# Patient Record
Sex: MALE | Race: WHITE | Employment: FULL TIME | URBAN - METROPOLITAN AREA
[De-identification: names, ages, dates, MRNs, and addresses within clinical notes are randomized per-mention and may not be internally consistent; named-entity substitution may affect disease eponyms.]

---

## 2021-05-19 ENCOUNTER — HOSPITAL ENCOUNTER (EMERGENCY)
Age: 36
Discharge: HOME | End: 2021-05-19
Attending: EMERGENCY MEDICINE
Payer: COMMERCIAL

## 2021-05-19 ENCOUNTER — APPOINTMENT (OUTPATIENT)
Dept: CT IMAGING | Age: 36
End: 2021-05-19
Attending: EMERGENCY MEDICINE
Payer: COMMERCIAL

## 2021-05-19 VITALS
SYSTOLIC BLOOD PRESSURE: 126 MMHG | OXYGEN SATURATION: 100 % | HEART RATE: 86 BPM | DIASTOLIC BLOOD PRESSURE: 83 MMHG | RESPIRATION RATE: 15 BRPM | TEMPERATURE: 98.8 F

## 2021-05-19 DIAGNOSIS — R10.32 ABDOMINAL PAIN, LLQ (LEFT LOWER QUADRANT): Primary | ICD-10-CM

## 2021-05-19 LAB
ALBUMIN SERPL-MCNC: 4.7 G/DL (ref 3.5–5)
ALBUMIN/GLOB SERPL: 1.2 {RATIO} (ref 1.1–2.2)
ALP SERPL-CCNC: 70 U/L (ref 45–117)
ALT SERPL-CCNC: 51 U/L (ref 12–78)
ANION GAP SERPL CALC-SCNC: 8 MMOL/L (ref 5–15)
AST SERPL-CCNC: 20 U/L (ref 15–37)
BASOPHILS # BLD: 0.1 K/UL (ref 0–0.1)
BASOPHILS NFR BLD: 1 % (ref 0–1)
BILIRUB SERPL-MCNC: 0.4 MG/DL (ref 0.2–1)
BUN SERPL-MCNC: 11 MG/DL (ref 6–20)
BUN/CREAT SERPL: 11 (ref 12–20)
CALCIUM SERPL-MCNC: 10.1 MG/DL (ref 8.5–10.1)
CHLORIDE SERPL-SCNC: 108 MMOL/L (ref 97–108)
CO2 SERPL-SCNC: 22 MMOL/L (ref 21–32)
CREAT SERPL-MCNC: 0.98 MG/DL (ref 0.7–1.3)
DIFFERENTIAL METHOD BLD: ABNORMAL
EOSINOPHIL # BLD: 0.1 K/UL (ref 0–0.4)
EOSINOPHIL NFR BLD: 0 % (ref 0–7)
ERYTHROCYTE [DISTWIDTH] IN BLOOD BY AUTOMATED COUNT: 12 % (ref 11.5–14.5)
GLOBULIN SER CALC-MCNC: 3.9 G/DL (ref 2–4)
GLUCOSE SERPL-MCNC: 87 MG/DL (ref 65–100)
HCT VFR BLD AUTO: 45.4 % (ref 36.6–50.3)
HGB BLD-MCNC: 15.6 G/DL (ref 12.1–17)
IMM GRANULOCYTES # BLD AUTO: 0 K/UL (ref 0–0.04)
IMM GRANULOCYTES NFR BLD AUTO: 0 % (ref 0–0.5)
LIPASE SERPL-CCNC: 85 U/L (ref 73–393)
LYMPHOCYTES # BLD: 2.4 K/UL (ref 0.8–3.5)
LYMPHOCYTES NFR BLD: 20 % (ref 12–49)
MCH RBC QN AUTO: 29.4 PG (ref 26–34)
MCHC RBC AUTO-ENTMCNC: 34.4 G/DL (ref 30–36.5)
MCV RBC AUTO: 85.5 FL (ref 80–99)
MONOCYTES # BLD: 0.6 K/UL (ref 0–1)
MONOCYTES NFR BLD: 5 % (ref 5–13)
NEUTS SEG # BLD: 8.8 K/UL (ref 1.8–8)
NEUTS SEG NFR BLD: 74 % (ref 32–75)
NRBC # BLD: 0 K/UL (ref 0–0.01)
NRBC BLD-RTO: 0 PER 100 WBC
PLATELET # BLD AUTO: 400 K/UL (ref 150–400)
PMV BLD AUTO: 11.3 FL (ref 8.9–12.9)
POTASSIUM SERPL-SCNC: 3.6 MMOL/L (ref 3.5–5.1)
PROT SERPL-MCNC: 8.6 G/DL (ref 6.4–8.2)
RBC # BLD AUTO: 5.31 M/UL (ref 4.1–5.7)
SODIUM SERPL-SCNC: 138 MMOL/L (ref 136–145)
WBC # BLD AUTO: 12 K/UL (ref 4.1–11.1)

## 2021-05-19 PROCEDURE — 74011250636 HC RX REV CODE- 250/636: Performed by: EMERGENCY MEDICINE

## 2021-05-19 PROCEDURE — 85025 COMPLETE CBC W/AUTO DIFF WBC: CPT

## 2021-05-19 PROCEDURE — 80053 COMPREHEN METABOLIC PANEL: CPT

## 2021-05-19 PROCEDURE — 74177 CT ABD & PELVIS W/CONTRAST: CPT

## 2021-05-19 PROCEDURE — 83690 ASSAY OF LIPASE: CPT

## 2021-05-19 PROCEDURE — 74011000636 HC RX REV CODE- 636: Performed by: RADIOLOGY

## 2021-05-19 PROCEDURE — 99283 EMERGENCY DEPT VISIT LOW MDM: CPT

## 2021-05-19 PROCEDURE — 36415 COLL VENOUS BLD VENIPUNCTURE: CPT

## 2021-05-19 RX ADMIN — IOPAMIDOL 100 ML: 755 INJECTION, SOLUTION INTRAVENOUS at 20:29

## 2021-05-19 RX ADMIN — SODIUM CHLORIDE 1000 ML: 9 INJECTION, SOLUTION INTRAVENOUS at 18:18

## 2021-05-19 NOTE — ED PROVIDER NOTES
Mr. Juliann Alarcon is a 32yo male who presents to the ER with complaints of left-sided abdominal fullness. He states that his symptoms have been present for the last month. He has had several visits to his doctor in urgent care during this time. He was sent here to the ER today because he is still having this fullness. Labs in the urgent care showed an elevated white blood cell count. They are concerned about diverticulitis. Patient said he had some changes in his bowel movements recently where his bowels were not quite as big as previous. He has been started on MiraLAX and had a normal bowel movement today. He said that his sense of fullness and mild pain has never really fully gone away over the last month. Does seem to wax and wane some though. No fevers or chills. No nausea or vomiting. No changes with his urine or bowel movements. No penile discharge or pain. History reviewed. No pertinent past medical history. History reviewed. No pertinent surgical history. History reviewed. No pertinent family history. Social History     Socioeconomic History    Marital status:      Spouse name: Not on file    Number of children: Not on file    Years of education: Not on file    Highest education level: Not on file   Occupational History    Not on file   Tobacco Use    Smoking status: Not on file   Substance and Sexual Activity    Alcohol use: Not on file    Drug use: Not on file    Sexual activity: Not on file   Other Topics Concern    Not on file   Social History Narrative    Not on file     Social Determinants of Health     Financial Resource Strain:     Difficulty of Paying Living Expenses:    Food Insecurity:     Worried About Running Out of Food in the Last Year:     [address] in the Last Year:    Transportation Needs:     Lack of Transportation (Medical):      Lack of Transportation (Non-Medical):    Physical Activity:     Days of Exercise per Week:     Minutes of Exercise per Session:    Stress:     Feeling of Stress :    Social Connections:     Frequency of Communication with Friends and Family:     Frequency of Social Gatherings with Friends and Family:     Attends Bahai Services:     Active Member of Clubs or Organizations:     Attends Club or Organization Meetings:     Marital Status:    Intimate Partner Violence:     Fear of Current or Ex-Partner:     Emotionally Abused:     Physically Abused:     Sexually Abused: ALLERGIES: Erythromycin base    Review of Systems   Constitutional: Negative for chills and fever. HENT: Negative for rhinorrhea and sore throat. Respiratory: Negative for cough and shortness of breath. Cardiovascular: Negative for chest pain. Gastrointestinal: Positive for abdominal pain. Negative for diarrhea, nausea and vomiting. Genitourinary: Negative for dysuria and hematuria. Musculoskeletal: Negative for arthralgias and myalgias. Skin: Negative for pallor and rash. Neurological: Negative for dizziness, weakness and light-headedness. All other systems reviewed and are negative. Vitals:    05/19/21 1608   BP: (!) 147/80   Pulse: (!) 101   Resp: 20   Temp: 98.8 °F (37.1 °C)   SpO2: 100%            Physical Exam     Vital signs reviewed. Nursing notes reviewed.     Const:  No acute distress, well developed, well nourished  Head:  Atraumatic, normocephalic  Eyes:  PERRL, conjunctiva normal, no scleral icterus  Neck:  Supple, trachea midline  Cardiovascular: Tachycardic  Resp:  No resp distress, no increased work of breathing  Abd:  Soft, non-tender, non-distended, no rebound, no guarding  MSK:  No pedal edema, normal ROM  Neuro:  Alert and oriented x3, no cranial nerve defect  Skin:  Warm, dry, intact  Psych: Mildly anxious appearing      MDM  Number of Diagnoses or Management Options     Amount and/or Complexity of Data Reviewed  Clinical lab tests: ordered and reviewed  Tests in the radiology section of CPT®: ordered and reviewed  Review and summarize past medical records: yes    Patient Progress  Patient progress: stable          Ms. Jairon Lewis is a 30yo male who presents to the ER with complaints of left sided abd pain. No tenderness on exam.  No urinary sx. Pt. Does have an elevated WBC. No diverticulitis or other acute process on CT. Pt. To f/u with his PCP and GI or return to the ER with new or worsening sx.       Procedures

## 2021-05-19 NOTE — ED TRIAGE NOTES
Pt sent to ED from [facility] for possible diverticulitis. Pt c/o LEFT sided abd discomfort. WBC at [facility] 15.

## 2021-05-20 ENCOUNTER — PATIENT OUTREACH (OUTPATIENT)
Dept: OTHER | Age: 36
End: 2021-05-20

## 2021-05-20 NOTE — PROGRESS NOTES
[facility] progress note    Patient eligible for [facility] care management  Discussed the care management program.  Patient agrees to care management services at this time. PMH: No past medical history on file. Social History:   Social History     Socioeconomic History    Marital status:      Spouse name: Not on file    Number of children: Not on file    Years of education: Not on file    Highest education level: Not on file   Occupational History    Not on file   Tobacco Use    Smoking status: Not on file   Substance and Sexual Activity    Alcohol use: Not on file    Drug use: Not on file    Sexual activity: Not on file   Other Topics Concern    Not on file   Social History Narrative    Not on file     Social Determinants of Health     Financial Resource Strain:     Difficulty of Paying Living Expenses:    Food Insecurity:     Worried About Running Out of Food in the Last Year:     [address] in the Last Year:    Transportation Needs:     Lack of Transportation (Medical):  Lack of Transportation (Non-Medical):    Physical Activity:     Days of Exercise per Week:     Minutes of Exercise per Session:    Stress:     Feeling of Stress :    Social Connections:     Frequency of Communication with Friends and Family:     Frequency of Social Gatherings with Friends and Family:     Attends Mu-ism Services:     Active Member of Clubs or Organizations:     Attends Club or Organization Meetings:     Marital Status:    Intimate Partner Violence:     Fear of Current or Ex-Partner:     Emotionally Abused:     Physically Abused:     Sexually Abused:      Patient is 30 yo male seen in at [facility] [place] on 5/19/21 for complaints of LLQ abdominal pain and fullness. Patient reports 1 month history of same pain. Seen in Urgent Care on 5/20/21 and sent to ER due to WBC of 15,000. In ER WBC repeated was 12,000, other labs normal and  CT was negative.  Patient was discharged with instructions for follow up with PCP and GI.     ECM spoke with patient who reports he is feeling ok, no real change in pain/fullness, and states has more \"rumbling\" and gas in abdomen, but denies any other symptoms or concerns. Patient is scheduled to see PCP tomorrow 5/21/21 and plans to discuss referral to GI. Reviewed Red Flag Symptoms and discharge instructions, patient verbalized understanding. Will plan follow up in about 7 to 10 days. Patient's primary care provider relationship reviewed with patient and modified, as applicable. Care management assessment completed:    Condition Focused Assessment: Gastrointestinal Condition Focused Assessment    Skin- any open wounds, incisions or appliance no  New or worsening pain? yes  If yes, pain rated 0-10: 4-5 Location/pain characteristics: LLQ/fullness   New or worsening numbness or tingling? no  Activity level- moving several times a day, or as recommended? yes  Abnormal activity level reported: no   Nutrition- prescribed diet? yes   Diet prescribed or recommended: regular  Difficulty swallowing no  Last BM on 5/19/21 abnormal consistency or amount no  Abnormal labs: yes, WBC 12,000   In the last 24 hour have you experienced; Fever no  Low body temperature no  Chills or shaking no  Sweating no  Fast heart rate no  Fast breathing no  Dizziness/lightheadedness no  Confusion or unusual change in mental status no  Diarrhea no  Nausea no   Vomiting no  Shortness of breath or difficulty breathing no  Less urine output no  Cold, clammy, and pale skin no  Skin rash or skin color changes no      Medications:  New Medications at Discharge: none  Changed Medications at Discharge: none  Discontinued Medications at Discharge: none  No current outpatient medications on file. No current facility-administered medications for this visit. There are no discontinued medications.     Performed medication reconciliation with patient, and patient verbalizes understanding of administration of home medications. There were no barriers to obtaining medications identified at this time. Preventive Care     Health Maintenance   Topic Date Due    Hepatitis C Screening  Never done    COVID-19 Vaccine (1) Never done    DTaP/Tdap/Td series (1 - Tdap) Never done    Flu Vaccine (Season Ended) 09/01/2021    Pneumococcal 0-64 years  Aged Out        Initial Plan of Care:  Goal:Demonstrates no signs of complications or red flags in 30 days;   Review and discuss importance of monitoring and reporting red flags;   Review medications and when taken with patient to ensure understanding;  · Educate patient when to call the physician or 911;  · Assess for any barriers with care access or mobility needs at home    Goal:Completes all follow-up appointments within 30 days of ED visit;  · 5/20/21 Scheduled for PCP follow up on 5/21/21   Educate and encourage importance of FU for prevention of complications or disease progression;   Assess the patients relationship with a PCP and next FU visit scheduled;   Discuss importance of adherence to treatment plan and follow up visits;   Identify any barriers in transportation or access to FU appointments.  Assist patient with making FU appointments as needed;   o Develop list of questions, concerns before visit.  o Importance of knowing your numbers, test results. o Keep a list of the medicines you take. Assess for any barriers with care access or mobility needs at homeBarriers/Support system:  patient and spouse      Barriers/Challenges to Care: []  Decline in memory    []  Language barrier     []  Emotional                  []  Limited mobility  []  Lack of motivation     [] Vision, hearing or cognitive impairment []  Knowledge [] Financial Barriers []  Lack of support  []  Pain [x]  None    PCP/Specialist follow up: PCP 5/21/21   Reviewed red flags with patient, and patient verbalizes understanding.   Patient given an opportunity to ask questions. No other clinical/social/functional needs noted. The patient agrees to contact the PCP office for questions related to their healthcare. The patient expressed thanks, offered no additional questions and ended the call.       Plan for next call:7 to 10 days

## 2021-05-23 ENCOUNTER — HOSPITAL ENCOUNTER (EMERGENCY)
Age: 36
Discharge: HOME | End: 2021-05-23
Attending: EMERGENCY MEDICINE
Payer: COMMERCIAL

## 2021-05-23 VITALS
SYSTOLIC BLOOD PRESSURE: 118 MMHG | RESPIRATION RATE: 16 BRPM | BODY MASS INDEX: 34.46 KG/M2 | TEMPERATURE: 98.8 F | HEART RATE: 65 BPM | OXYGEN SATURATION: 100 % | WEIGHT: 260 LBS | DIASTOLIC BLOOD PRESSURE: 80 MMHG | HEIGHT: 73 IN

## 2021-05-23 DIAGNOSIS — R00.2 PALPITATIONS: Primary | ICD-10-CM

## 2021-05-23 LAB
ALBUMIN SERPL-MCNC: 4.4 G/DL (ref 3.5–5)
ALBUMIN/GLOB SERPL: 1.2 {RATIO} (ref 1.1–2.2)
ALP SERPL-CCNC: 62 U/L (ref 45–117)
ALT SERPL-CCNC: 40 U/L (ref 12–78)
ANION GAP SERPL CALC-SCNC: 8 MMOL/L (ref 5–15)
AST SERPL-CCNC: 18 U/L (ref 15–37)
BASOPHILS # BLD: 0.1 K/UL (ref 0–0.1)
BASOPHILS NFR BLD: 1 % (ref 0–1)
BILIRUB SERPL-MCNC: 0.3 MG/DL (ref 0.2–1)
BUN SERPL-MCNC: 10 MG/DL (ref 6–20)
BUN/CREAT SERPL: 9 (ref 12–20)
CALCIUM SERPL-MCNC: 9.6 MG/DL (ref 8.5–10.1)
CHLORIDE SERPL-SCNC: 107 MMOL/L (ref 97–108)
CO2 SERPL-SCNC: 21 MMOL/L (ref 21–32)
COMMENT, HOLDF: NORMAL
CREAT SERPL-MCNC: 1.06 MG/DL (ref 0.7–1.3)
DIFFERENTIAL METHOD BLD: ABNORMAL
EOSINOPHIL # BLD: 0.1 K/UL (ref 0–0.4)
EOSINOPHIL NFR BLD: 1 % (ref 0–7)
ERYTHROCYTE [DISTWIDTH] IN BLOOD BY AUTOMATED COUNT: 11.9 % (ref 11.5–14.5)
GLOBULIN SER CALC-MCNC: 3.7 G/DL (ref 2–4)
GLUCOSE SERPL-MCNC: 97 MG/DL (ref 65–100)
HCT VFR BLD AUTO: 42.1 % (ref 36.6–50.3)
HGB BLD-MCNC: 14.7 G/DL (ref 12.1–17)
IMM GRANULOCYTES # BLD AUTO: 0 K/UL (ref 0–0.04)
IMM GRANULOCYTES NFR BLD AUTO: 0 % (ref 0–0.5)
LYMPHOCYTES # BLD: 2.9 K/UL (ref 0.8–3.5)
LYMPHOCYTES NFR BLD: 25 % (ref 12–49)
MCH RBC QN AUTO: 29.2 PG (ref 26–34)
MCHC RBC AUTO-ENTMCNC: 34.9 G/DL (ref 30–36.5)
MCV RBC AUTO: 83.5 FL (ref 80–99)
MONOCYTES # BLD: 1.1 K/UL (ref 0–1)
MONOCYTES NFR BLD: 9 % (ref 5–13)
NEUTS SEG # BLD: 7.7 K/UL (ref 1.8–8)
NEUTS SEG NFR BLD: 64 % (ref 32–75)
NRBC # BLD: 0 K/UL (ref 0–0.01)
NRBC BLD-RTO: 0 PER 100 WBC
PLATELET # BLD AUTO: 395 K/UL (ref 150–400)
PMV BLD AUTO: 11.1 FL (ref 8.9–12.9)
POTASSIUM SERPL-SCNC: 3.2 MMOL/L (ref 3.5–5.1)
PROT SERPL-MCNC: 8.1 G/DL (ref 6.4–8.2)
RBC # BLD AUTO: 5.04 M/UL (ref 4.1–5.7)
SAMPLES BEING HELD,HOLD: NORMAL
SODIUM SERPL-SCNC: 136 MMOL/L (ref 136–145)
TROPONIN I SERPL-MCNC: <0.05 NG/ML
WBC # BLD AUTO: 12 K/UL (ref 4.1–11.1)

## 2021-05-23 PROCEDURE — 99284 EMERGENCY DEPT VISIT MOD MDM: CPT

## 2021-05-23 PROCEDURE — 80053 COMPREHEN METABOLIC PANEL: CPT

## 2021-05-23 PROCEDURE — 96365 THER/PROPH/DIAG IV INF INIT: CPT

## 2021-05-23 PROCEDURE — 93005 ELECTROCARDIOGRAM TRACING: CPT

## 2021-05-23 PROCEDURE — 36415 COLL VENOUS BLD VENIPUNCTURE: CPT

## 2021-05-23 PROCEDURE — 84484 ASSAY OF TROPONIN QUANT: CPT

## 2021-05-23 PROCEDURE — 74011250636 HC RX REV CODE- 250/636: Performed by: EMERGENCY MEDICINE

## 2021-05-23 PROCEDURE — 85025 COMPLETE CBC W/AUTO DIFF WBC: CPT

## 2021-05-23 PROCEDURE — 74011250637 HC RX REV CODE- 250/637: Performed by: EMERGENCY MEDICINE

## 2021-05-23 RX ORDER — ALBUTEROL SULFATE 1.25 MG/3ML
1.25 SOLUTION RESPIRATORY (INHALATION)
COMMUNITY

## 2021-05-23 RX ORDER — FLUTICASONE PROPIONATE AND SALMETEROL 100; 50 UG/1; UG/1
1 POWDER RESPIRATORY (INHALATION) EVERY 12 HOURS
COMMUNITY

## 2021-05-23 RX ORDER — MAGNESIUM SULFATE HEPTAHYDRATE 40 MG/ML
2 INJECTION, SOLUTION INTRAVENOUS ONCE
Status: COMPLETED | OUTPATIENT
Start: 2021-05-23 | End: 2021-05-23

## 2021-05-23 RX ORDER — POTASSIUM CHLORIDE 750 MG/1
40 TABLET, FILM COATED, EXTENDED RELEASE ORAL
Status: COMPLETED | OUTPATIENT
Start: 2021-05-23 | End: 2021-05-23

## 2021-05-23 RX ORDER — ONDANSETRON 4 MG/1
4 TABLET, FILM COATED ORAL
COMMUNITY

## 2021-05-23 RX ORDER — HYOSCYAMINE SULFATE 0.12 MG/1
0.12 TABLET SUBLINGUAL
COMMUNITY
End: 2021-05-23

## 2021-05-23 RX ADMIN — POTASSIUM CHLORIDE 40 MEQ: 750 TABLET, EXTENDED RELEASE ORAL at 19:34

## 2021-05-23 RX ADMIN — MAGNESIUM SULFATE HEPTAHYDRATE 2 G: 40 INJECTION, SOLUTION INTRAVENOUS at 19:35

## 2021-05-23 NOTE — ED TRIAGE NOTES
Pt ambulatory to ED accompanied by wife with c/o rapid heart rate and left arm pain onset 1640 today. \"I was seen here Wednesday for abdominal CT. Friday, I went to my PCP and was prescribed Zofran and Levsin. I just want to make sure I'm alright.

## 2021-05-23 NOTE — ED PROVIDER NOTES
Seen 1 week ago for abd pain and started on Zofran and Levsin by his PCP,  Since his visit he has had nausea & dizziness as well as difficulty sleeping and night tremors. Has had increased stress and anxiety as well as increased activity in his abdominal organs. Had diarrhea yesterday and floating BMs today. Yesterday developed left arm spasm and earlier today he developed palpitations and \"feeling like I was working out\", associated with the left arm spasm but no chest pain or shortness of breath. The history is provided by the patient. Palpitations   Associated symptoms include abdominal pain. Pertinent negatives include no fever, no chest pain, no vomiting, no dizziness and no shortness of breath. Past Medical History:   Diagnosis Date    Asthma        History reviewed. No pertinent surgical history. History reviewed. No pertinent family history. Social History     Socioeconomic History    Marital status:      Spouse name: Not on file    Number of children: Not on file    Years of education: Not on file    Highest education level: Not on file   Occupational History    Not on file   Tobacco Use    Smoking status: Never Smoker    Smokeless tobacco: Never Used   Substance and Sexual Activity    Alcohol use: Yes    Drug use: Never    Sexual activity: Not on file   Other Topics Concern    Not on file   Social History Narrative    Not on file     Social Determinants of Health     Financial Resource Strain:     Difficulty of Paying Living Expenses:    Food Insecurity:     Worried About Running Out of Food in the Last Year:     [address] in the Last Year:    Transportation Needs:     Lack of Transportation (Medical):      Lack of Transportation (Non-Medical):    Physical Activity:     Days of Exercise per Week:     Minutes of Exercise per Session:    Stress:     Feeling of Stress :    Social Connections:     Frequency of Communication with Friends and Family:     Frequency of Social Gatherings with Friends and Family:     Attends Yazdanism Services:     Active Member of Clubs or Organizations:     Attends Club or Organization Meetings:     Marital Status:    Intimate Partner Violence:     Fear of Current or Ex-Partner:     Emotionally Abused:     Physically Abused:     Sexually Abused: ALLERGIES: Erythromycin base    Review of Systems   Constitutional: Positive for appetite change and fatigue. Negative for chills and fever. Respiratory: Negative for shortness of breath. Cardiovascular: Positive for palpitations. Negative for chest pain. Gastrointestinal: Positive for abdominal pain and diarrhea. Negative for constipation and vomiting. Musculoskeletal: Positive for myalgias. Neurological: Negative for dizziness and light-headedness. Psychiatric/Behavioral: Positive for sleep disturbance. The patient is nervous/anxious. All other systems reviewed and are negative. Vitals:    05/23/21 1701   Pulse: 92   Resp: 15   Temp: 99.7 °F (37.6 °C)   SpO2: 99%   Weight: 117.9 kg (260 lb)   Height: 6' 1\" (1.854 m)            Physical Exam  Vitals and nursing note reviewed. Constitutional:       General: He is not in acute distress. Appearance: He is well-developed. HENT:      Head: Normocephalic and atraumatic. Eyes:      Conjunctiva/sclera: Conjunctivae normal.      Pupils: Pupils are equal, round, and reactive to light. Cardiovascular:      Rate and Rhythm: Normal rate and regular rhythm. Pulmonary:      Effort: Pulmonary effort is normal.      Breath sounds: Normal breath sounds. Abdominal:      General: There is no distension. Palpations: Abdomen is soft. Tenderness: There is no abdominal tenderness. Musculoskeletal:         General: Normal range of motion. Cervical back: Normal range of motion. Skin:     General: Skin is dry. Capillary Refill: Capillary refill takes less than 2 seconds.    Neurological: General: No focal deficit present. Mental Status: He is alert and oriented to person, place, and time. Psychiatric:         Mood and Affect: Mood is anxious. Speech: Speech normal.         Behavior: Behavior normal.          MDM       Procedures      The patient is resting comfortably and feels better, is alert and in no distress. The repeat examination is unremarkable and benign. The electrocardiogram shows no signs of acute ischemia and the history, exam, diagnostic testing and current condition do not suggest that this patient is having an acute myocardial infarction, significant arrhythmia, unstable angina, esophageal perforation, pulmonary embolism, aortic dissection, pneumothorax, severe pneumonia, sepsis or other significant pathology that would warrant further testing, continued ED treatment, admission, or cardiology or other specialist consultation at this point. The vital signs have been stable. Patient will stop his Levsin as it may be contributing to his palpitations. Additionally, he will increase his intake of potassium rich foods as his slightly low potassium in the setting of diarrhea may also be contributing to his symptoms. The patient's condition is stable and appropriate for discharge. The patient will pursue further outpatient evaluation with the primary care physician, other designated physician or cardiologist. The patient and/or caregivers have expressed a clear and thorough understanding and agree to follow up as instructed.

## 2021-05-24 ENCOUNTER — PATIENT OUTREACH (OUTPATIENT)
Dept: OTHER | Age: 36
End: 2021-05-24

## 2021-05-24 LAB
ATRIAL RATE: 85 BPM
CALCULATED P AXIS, ECG09: 20 DEGREES
CALCULATED R AXIS, ECG10: 44 DEGREES
CALCULATED T AXIS, ECG11: 52 DEGREES
DIAGNOSIS, 93000: NORMAL
P-R INTERVAL, ECG05: 114 MS
Q-T INTERVAL, ECG07: 358 MS
QRS DURATION, ECG06: 100 MS
QTC CALCULATION (BEZET), ECG08: 426 MS
VENTRICULAR RATE, ECG03: 85 BPM

## 2021-05-24 NOTE — PROGRESS NOTES
Care Manager contacted the patient by telephone in follow up. Verified  and zip code with patient as identifiers. Patient current with CM due to ER visit on 21 for abdominal pain and fullness. Patient followed with PCP on 21 and was prescribed Zofran and Levsin. Patient returned to ER on 21 due to complaints of palpitations, dizziness and jerking type spasm of left arm. Work up in ER was negative and patient was advised to [facility] and follow up with PCP. ACM spoke with patient who reports he is feeling better. States he discontinued Levsin and spasm of arm has stopped. Reports he followed up with PCP who thinks other symptoms are related to anxiety which cause patient to have a panic attack. Patient has history of anxiety, but states this was the first true panic attack he has had. Patient reports he has been very anxious over results of lab work performed by PCP and thinks that is what caused the anxiety to peak. PCP is continuing work up for abdominal pain and patient has asked for GI referral and colonoscopy. Patient states overall he is better, denies any worsening or new symptoms and reports the dizziness and palpitations have stopped. Patient has close follow up with PCP scheduled and call PCP for any new or worsening symptoms.      Assessment of clinical changes and knowledge demonstrated since last call:   Ongoing Plan of Care:   Goal:Demonstrates no signs of complications or red flags in 30 days;  ** Patient returned to ER 21 due to new complaints,see above  · Review and discuss importance of monitoring and reporting red flags;  · Review medications and when taken with patient to ensure understanding;  · Educate patient when to call the physician or 911;  · Assess for any barriers with care access or mobility needs at home     Goal:Completes all follow-up appointments within 30 days of ED visit;  * 21 Patient completed visit with PCP and is scheduled for follow up with PCP, plan is for referral to GI and colonoscopy. · 5/20/21 Scheduled for PCP follow up on 5/21/21  · Educate and encourage importance of FU for prevention of complications or disease progression;  · Assess the patients relationship with a PCP and next FU visit scheduled;  · Discuss importance of adherence to treatment plan and follow up visits;  · Identify any barriers in transportation or access to FU appointments.   · Assist patient with making FU appointments as needed;   ? Develop list of questions, concerns before visit. ? Importance of knowing your numbers, test results. ? Keep a list of the medicines you take.              Review and discussion of plan of care with patient, who has provided input to plan, verbalized understanding and agrees with current goals. Any recurrence Red Flags or continued symptoms:see above     Medication Regimen Change:5/21/21 Zofran and Levsin;  5/23/21 Levsin discontinued   Completed a review of medications with patient, who verbalized understanding of how and when to take medications. Barriers / Adherence with medications:none    Upcoming Appointments:  Patient is scheduled for follow up with PCP [facility] provider)  Patient asked questions appropriately and denied any additional needs at this time. Patient verbalized understanding of all information discussed. Patient has my name and contact information for any follow up needs or questions.      Plan next call:  7 to 10 days

## 2021-05-26 ENCOUNTER — PATIENT OUTREACH (OUTPATIENT)
Dept: OTHER | Age: 36
End: 2021-05-26

## 2021-05-26 NOTE — PROGRESS NOTES
10:00am call received from patient with message left regarding an appointment with GI. Request assistance from [employer] with scheduling the appointment. Patient reports he has talked to PCP office ([facility] provider) who states they have placed order for GI consult due continued abdominal pain and GI symptoms. Patient unsure of provider, was told by PCP's nurse [facility]. 12:34 pm Call placed to patient to discuss GI offices in his area and identified GI Specialist to call for an appointment. ACM called office and scheduled first visit for 6/18/21 at 3:30 with Misa Adams NP. [phone] [phone]. Patient to call [phone] 5 days prior to appointment to pre register for visit. 1:19pm Call placed to patient to notify of above information and appointment time. Patient agreeable with date/time and has all information. Patient appreciative of assistance and states he feels much better knowing the appointment is scheduled. No other concerns or questions at this time.

## 2021-05-27 ENCOUNTER — TRANSCRIBE ORDER (OUTPATIENT)
Dept: SCHEDULING | Age: 36
End: 2021-05-27

## 2021-06-14 ENCOUNTER — PATIENT OUTREACH (OUTPATIENT)
Dept: OTHER | Age: 36
End: 2021-06-14

## 2021-06-14 NOTE — PROGRESS NOTES
Care Manager contacted the patient by telephone in follow up. Verified  and zip code with patient as identifiers. ACM spoke with patient who reports he is feeling better. States was seen by new PCP for follow up (Dr Reshma Cole) who prescribed Augmentin and Metronidazole due to continued abdominal pain and elevated WBC. Reports symptoms have improved  With treatment. Scheduled to see psychologist on 21, follow up with PCP on  and follow up with GI on 21. Denies any new symptoms or concerns. Will plan follow up in 10 to 14 days. Assessment of clinical changes and knowledge demonstrated since last call:   Ongoing Plan of Care:   Goal:Demonstrates no signs of complications or red flags in 30 days;  21 Reports some improvement in symptoms, no new complications or concerns  ** Patient returned to ER 21 due to new complaints,see above  · Review and discuss importance of monitoring and reporting red flags;  · Review medications and when taken with patient to ensure understanding;  · Educate patient when to call the physician or 911;  · Assess for any barriers with care access or mobility needs at home     Goal:Completes all follow-up appointments within 30 days of ED visit;  21 Completed visit with PCP; Scheduled with psychologist 21;PCP21 and GI 21  * 21 Patient completed visit with PCP and is scheduled for follow up with PCP, plan is for referral to GI and colonoscopy. · 21 Scheduled for PCP follow up on 21  · Educate and encourage importance of FU for prevention of complications or disease progression;  · Assess the patients relationship with a PCP and next FU visit scheduled;  · Discuss importance of adherence to treatment plan and follow up visits;  · Identify any barriers in transportation or access to FU appointments.   · Assist patient with making FU appointments as needed;   ? Develop list of questions, concerns before visit.   ? Importance of knowing your numbers, test results. ? Keep a list of the medicines you take.           Review and discussion of plan of care with patient, who has provided input to plan, verbalized understanding and agrees with current goals. Any recurrence Red Flags or continued symptoms:none     Medication Regimen Change:Metronidazole,Augmentin  Completed a review of medications with patient, who verbalized understanding of how and when to take medications. Barriers / Adherence with medications:none    Upcoming Appointments:  Psychologist 6/16/21;PCP6/17/21 GI 6/18/21  Patient asked questions appropriately and denied any additional needs at this time. Patient verbalized understanding of all information discussed. Patient has my name and contact information for any follow up needs or questions.      Plan next call: 10 to 14 days

## 2021-06-29 ENCOUNTER — PATIENT OUTREACH (OUTPATIENT)
Dept: OTHER | Age: 36
End: 2021-06-29

## 2021-06-29 NOTE — PROGRESS NOTES
Care Manager contacted the patient by telephone in follow up. Verified  and zip code with patient as identifiers. Patient reports he is doing ok. Completed GI and PCP follow up, scheduled for Colonoscopy and EGD on 21. Also seeing psychologist for assistance with anxiety, PCP and Psychologist working together with medication and counseling, started on Lexapro and Klonopin, which seem to be helping. States abdominal pain is less and only at intervals. Patient pleased with progress. Will plan follow up after EGD/[place]. Assessment of clinical changes and knowledge demonstrated since last call:   Ongoing Plan of Care:   Goal:Demonstrates no signs of complications or red flags in 30 days;  21 Continues to report progress in symptoms and anxiety  21 Reports some improvement in symptoms, no new complications or concerns  ** Patient returned to ER 21 due to new complaints,see above  · Review and discuss importance of monitoring and reporting red flags;  · Review medications and when taken with patient to ensure understanding;  · Educate patient when to call the physician or 911;  · Assess for any barriers with care access or mobility needs at home     Goal:Completes all follow-up appointments within 30 days of ED visit;  21 Completed PCP,GI and psychologist visits. Scheduled for EGD/[place] 21 Completed visit with PCP; Scheduled with psychologist 21;PCP21 and GI 21  * 21 Patient completed visit with PCP and is scheduled for follow up with PCP, plan is for referral to GI and colonoscopy.   · 21 Scheduled for PCP follow up on 21  · Educate and encourage importance of FU for prevention of complications or disease progression;  · Assess the patients relationship with a PCP and next FU visit scheduled;  · Discuss importance of adherence to treatment plan and follow up visits;  · Identify any barriers in transportation or access to FU appointments.   · Assist patient with making FU appointments as needed;   ? Develop list of questions, concerns before visit. ? Importance of knowing your numbers, test results. ? Keep a list of the medicines you take.           Review and discussion of plan of care with patient, who has provided input to plan, verbalized understanding and agrees with current goals. Any recurrence Red Flags or continued symptoms: see above     Medication Regimen Change:Lexapro and Klonopin  Completed a review of medications with patient, who verbalized understanding of how and when to take medications. Barriers / Adherence with medications:none    Upcoming Appointments:  EGD/[place] 7/7/21    Patient asked questions appropriately and denied any additional needs at this time. Patient verbalized understanding of all information discussed. Patient has my name and contact information for any follow up needs or questions.      Plan next call: 2 to 3 weeks

## 2021-07-19 ENCOUNTER — PATIENT OUTREACH (OUTPATIENT)
Dept: OTHER | Age: 36
End: 2021-07-19

## 2021-07-19 NOTE — PROGRESS NOTES
Care Manager contacted the patient by telephone in follow up. Verified  and zip code with patient as identifiers. Patient reports he has been doing very well. Reports improvement in GI symptoms, until last night, thinks symptoms related to something he ate. Reports slight nausea, bloating frequent BM's, but denies diarrhea. Reported symptoms to PCP who advised to keep a food diary and track symptoms and foods. Patient completed EGD and Colonoscopy, states awaiting final pathology from polyp removal but both EGD and Colonoscopy were negative. States he continues to see Psychologist weekly and has made great progress with anxiety. No new concerns or questions today. Assessment of clinical changes and knowledge demonstrated since last call:   Ongoing Plan of Care:   Goal:Demonstrates no signs of complications or red flags in 30 days;  21 Reports continued progress with anxiety, Had reoccurrence of GI sx possibly related to food eaten, is tracking sx and food diary. 21 Continues to report progress in symptoms and anxiety  21 Reports some improvement in symptoms, no new complications or concerns  ** Patient returned to ER 21 due to new complaints,see above  · Review and discuss importance of monitoring and reporting red flags;  · Review medications and when taken with patient to ensure understanding;  · Educate patient when to call the physician or 911;  · Assess for any barriers with care access or mobility needs at home     Goal:Completes all follow-up appointments within 30 days of ED visit;  21 Completed EGD and Colonoscopy, Continues to see Psychologist  21 Completed PCP,GI and psychologist visits. Scheduled for EGD/[place] 21 Completed visit with PCP; Scheduled with psychologist 21;PCP21 and GI 21  * 21 Patient completed visit with PCP and is scheduled for follow up with PCP, plan is for referral to GI and colonoscopy.   · 21 Scheduled for PCP follow up on 5/21/21  · Educate and encourage importance of FU for prevention of complications or disease progression;  · Assess the patients relationship with a PCP and next FU visit scheduled;  · Discuss importance of adherence to treatment plan and follow up visits;  · Identify any barriers in transportation or access to FU appointments.   · Assist patient with making FU appointments as needed;   ? Develop list of questions, concerns before visit. ? Importance of knowing your numbers, test results. ? Keep a list of the medicines you take.              Review and discussion of plan of care with patient, who has provided input to plan, verbalized understanding and agrees with current goals. Any recurrence Red Flags or continued symptoms:see above     Medication Regimen Change:Stopped Klonopin  Completed a review of medications with patient, who verbalized understanding of how and when to take medications. Barriers / Adherence with medications:none      Patient asked questions appropriately and denied any additional needs at this time. Patient verbalized understanding of all information discussed. Patient has my name and contact information for any follow up needs or questions.      Plan next call: 3 to 4 weeks

## 2021-08-04 ENCOUNTER — PATIENT OUTREACH (OUTPATIENT)
Dept: OTHER | Age: 36
End: 2021-08-04

## 2021-08-04 NOTE — PROGRESS NOTES
[facility] Follow Up. Telephone attempt to contact patient for [facility] Follow up. Left discreet message on voicemail with this CC contact information.   Will plan follow up in 2 to 3 weeks

## 2021-08-25 ENCOUNTER — PATIENT OUTREACH (OUTPATIENT)
Dept: OTHER | Age: 36
End: 2021-08-25

## 2021-10-30 ENCOUNTER — IMMUNIZATION (OUTPATIENT)
Dept: INTERNAL MEDICINE CLINIC | Age: 36
End: 2021-10-30
Payer: COMMERCIAL

## 2021-10-30 DIAGNOSIS — Z23 ENCOUNTER FOR IMMUNIZATION: Primary | ICD-10-CM

## 2021-10-30 PROCEDURE — 0003A PR ADM SARSCOV2 30MCG/0.3ML 3RD (0003A): CPT | Performed by: FAMILY MEDICINE

## 2021-10-30 PROCEDURE — 91300 COVID-19, MRNA, LNP-S, PF, 30MCG/0.3ML DOSE(PFIZER): CPT | Performed by: FAMILY MEDICINE

## 2023-05-14 RX ORDER — ONDANSETRON 4 MG/1
4 TABLET, FILM COATED ORAL EVERY 8 HOURS PRN
COMMUNITY

## 2023-05-14 RX ORDER — FLUTICASONE PROPIONATE AND SALMETEROL 100; 50 UG/1; UG/1
1 POWDER RESPIRATORY (INHALATION) EVERY 12 HOURS
COMMUNITY

## 2023-05-14 RX ORDER — ALBUTEROL SULFATE 1.25 MG/3ML
1.25 SOLUTION RESPIRATORY (INHALATION) EVERY 6 HOURS PRN
COMMUNITY